# Patient Record
Sex: FEMALE | Race: OTHER | NOT HISPANIC OR LATINO | Employment: PART TIME | URBAN - METROPOLITAN AREA
[De-identification: names, ages, dates, MRNs, and addresses within clinical notes are randomized per-mention and may not be internally consistent; named-entity substitution may affect disease eponyms.]

---

## 2017-01-03 ENCOUNTER — SLEEP CENTER VISIT (OUTPATIENT)
Dept: SLEEP MEDICINE | Facility: MEDICAL CENTER | Age: 27
End: 2017-01-03
Payer: COMMERCIAL

## 2017-01-03 VITALS
SYSTOLIC BLOOD PRESSURE: 104 MMHG | RESPIRATION RATE: 16 BRPM | OXYGEN SATURATION: 77 % | WEIGHT: 147 LBS | DIASTOLIC BLOOD PRESSURE: 80 MMHG | TEMPERATURE: 98.8 F | HEART RATE: 98 BPM | BODY MASS INDEX: 23.63 KG/M2 | HEIGHT: 66 IN

## 2017-01-03 DIAGNOSIS — G47.419 NARCOLEPSY WITHOUT CATAPLEXY(347.00): ICD-10-CM

## 2017-01-03 DIAGNOSIS — G47.11 IDIOPATHIC HYPERSOMNOLENCE: ICD-10-CM

## 2017-01-03 PROCEDURE — 99214 OFFICE O/P EST MOD 30 MIN: CPT | Performed by: INTERNAL MEDICINE

## 2017-01-03 RX ORDER — ARMODAFINIL 150 MG/1
1 TABLET ORAL EVERY MORNING
Qty: 30 TAB | Refills: 4 | Status: SHIPPED | OUTPATIENT
Start: 2017-01-03 | End: 2017-07-14 | Stop reason: SDUPTHER

## 2017-01-03 NOTE — MR AVS SNAPSHOT
"        Dylan Clark   1/3/2017 11:20 AM   Sleep Center Visit   MRN: [id number]    Department:  [facility]   Dept Phone:  [phone]    Description:  Female : 1990   Provider:  Lan CAMP M.D.           Reason for Visit     Narcolepsy without Cataplexy      Allergies as of 1/3/2017     Allergen Noted Reactions    Bactrim [Sulfamethoxazole W-Trimethoprim] 2016   Itching      You were diagnosed with     Narcolepsy without cataplexy   [347.00.ICD-9-CM]       Idiopathic hypersomnolence   [777037]         Vital Signs     Blood Pressure Pulse Temperature Respirations Height Weight    104/80 mmHg 98 37.1 °C (98.8 °F) 16 1.676 m (5' 6\") 66.679 kg (147 lb)    Body Mass Index Oxygen Saturation Smoking Status             23.74 kg/m2 77% Never Smoker          Basic Information     Date Of Birth Sex Race Ethnicity Preferred Language    1990 Female Other Non- English      Your appointments     May 03, 2017  3:00 PM   Follow UP with C Rotation   [facility] (--)    [address] [place] [address]  [place] [id number] [phone]              Problem List              ICD-10-CM Priority Class Noted - Resolved    Obstructive sleep apnea G47.33   2016 - Present    Idiopathic hypersomnolence G47.11   2016 - Present    Narcolepsy without cataplexy G47.419   2016 - Present      Health Maintenance        Date Due Completion Dates    IMM HEP B VACCINE (1 of 3 - Primary Series) 1989 ---    IMM HEP A VACCINE (1 of 2 - Standard Series) 1990 ---    IMM VARICELLA (CHICKENPOX) VACCINE (1 of 2 - 2 Dose Adolescent Series) 2002 ---    IMM DTaP/Tdap/Td Vaccine (1 - Tdap) 2008 ---    PAP SMEAR 2010 ---    IMM INFLUENZA (1) 2016 ---            Current Immunizations     No immunizations on file.      Below and/or attached are the medications your provider expects you to take. Review all of your home medications and newly ordered medications " with your provider and/or pharmacist. Follow medication instructions as directed by your provider and/or pharmacist. Please keep your medication list with you and share with your provider. Update the information when medications are discontinued, doses are changed, or new medications (including over-the-counter products) are added; and carry medication information at all times in the event of emergency situations     Allergies:  BACTRIM - Itching               Medications  Valid as of: January 03, 2017 - 11:46 AM    Generic Name Brand Name Tablet Size Instructions for use    Armodafinil (Tab) Armodafinil 150 MG Take 1 Tab by mouth every morning.        Norethin Ace-Eth Estrad-FE (Tab) BLISOVI FE 1.5/30 1.5-30 MG-MCG Take  by mouth every day.        .                 Medicines prescribed today were sent to:     [facility]/PHARMACY #[id number] - [place], [place] - [address]    [address] [place] [address]    Phone: [phone] Fax: [phone]    Open 24 Hours?: No      Medication refill instructions:       If your prescription bottle indicates you have medication refills left, it is not necessary to call your provider’s office. Please contact your pharmacy and they will refill your medication.    If your prescription bottle indicates you do not have any refills left, you may request refills at any time through one of the following ways: The online Tripbirds system (except Urgent Care), by calling your provider’s office, or by asking your pharmacy to contact your provider’s office with a refill request. Medication refills are processed only during regular business hours and may not be available until the next business day. Your provider may request additional information or to have a follow-up visit with you prior to refilling your medication.   *Please Note: Medication refills are assigned a new Rx number when refilled electronically. Your pharmacy may indicate that no refills were authorized even though a new  prescription for the same medication is available at the pharmacy. Please request the medicine by name with the pharmacy before contacting your provider for a refill.           Crowdonomic Media Access Code: [email or web address]  Expires: 1/18/2017  9:20 AM    Crowdon[employer] Media  A secure, online tool to manage your health information     [employer] Crowdon[employer] Media® is a secure, online tool that connects you to your personalized health information from the privacy of your home -- day or night - making it very easy for you to manage your healthcare. Once the activation process is completed, you can even access your medical information using the Crowdonomic Media buddy, which is available for free in the Apple Buddy store or Google Play store.     Crowdon[employer] Media provides the following levels of access (as shown below):   My Chart Features   [facility]  Urgent  Care Non-[facility]  Primary Care  Doctor   Email your healthcare team securely and privately 24/7 X X X    Manage appointments: schedule your next appointment; view details of past/upcoming appointments X      Request prescription refills. X      View recent personal medical records, including lab and immunizations X X X X   View health record, including health history, allergies, medications X X X X   Read reports about your outpatient visits, procedures, consult and ER notes X X X X   See your discharge summary, which is a recap of your hospital and/or ER visit that includes your diagnosis, lab results, and care plan. X X       How to register for Crowdonomic Media:  1. Go to  [email or web address].  2. Click on the Sign Up Now box, which takes you to the New Member Sign Up page. You will need to provide the following information:  a. Enter your Crowdon[employer] Media Access Code exactly as it appears at the top of this page. (You will not need to use this code after you’ve completed the sign-up process. If you do not sign up before the expiration date, you must request a new code.)    b. Enter your date of birth.   c. Enter your home email address.   d. Click Submit, and follow the next screen’s instructions.  3. Create a DoNanza ID. This will be your DoNanza login ID and cannot be changed, so think of one that is secure and easy to remember.  4. Create a [employer] password. You can change your password at any time.  5. Enter your Password Reset Question and Answer. This can be used at a later time if you forget your password.   6. Enter your e-mail address. This allows you to receive e-mail notifications when new information is available in DoNanza.  7. Click Sign Up. You can now view your health information.    For assistance activating your DoNanza account, call [phone]

## 2017-01-03 NOTE — PROGRESS NOTES
CC:  Excessive daytime somnolence, narcolepsy without cataplexy.    HPI:   Ms. Clark presented with severe daytime somnolence. Polysomnography on the 31st 2016 demonstrated a normal apnea hypopnea index was 0.3 events per hour, a minimum arterial oxygen saturation are 92% on room air, and good continuity of sleep with a low arousal and awakening index. A subsequent polysomnogram on July 27, 2016 demonstrated similar results and was followed by multiple sleep latency study that demonstrated a mean sleep onset latency of 2 minutes and 22 seconds. One REM sleep period was identified but it occurred after a short burst of N2 sleep.    She has been treated with Nuvigil at 150 mg a day. She has been taking the medication regularly and has noticed a significant improvement in daytime alertness. She still falls asleep as a passenger in a motor vehicle and occasionally during prolonged time viewing a computer screen but she has not fallen asleep during engaging activities, meals, conversations or while driving a motor vehicle. She has experienced no side effects from the Nuvigil and specifically is experiencing no jitteriness or agitation. Her heart rate and blood pressure are acceptable.        Patient Active Problem List    Diagnosis Date Noted   • Narcolepsy without cataplexy 09/14/2016   • Obstructive sleep apnea 04/26/2016   • Idiopathic hypersomnolence 04/26/2016       Past Medical History   Diagnosis Date   • Chickenpox        No past surgical history on file.    No family history on file.    Social History     Social History   • Marital Status: Single     Spouse Name: N/A   • Number of Children: N/A   • Years of Education: N/A     Occupational History   • Not on file.     Social History Main Topics   • Smoking status: Never Smoker    • Smokeless tobacco: Never Used   • Alcohol Use: No   • Drug Use: No   • Sexual Activity: Not on file     Other Topics Concern   • Not on file     Social History Narrative  "      Current Outpatient Prescriptions   Medication Sig Dispense Refill   • BLISOVI FE 1.5/30 1.5-30 MG-MCG tablet Take  by mouth every day.     • Armodafinil (NUVIGIL) 150 MG Tab Take 1 Tab by mouth every morning. 30 Tab 3     No current facility-administered medications for this visit.    \"CURRENT RX\"      Allergies: Bactrim      ROS  Unchanged from prior and notable for the LEEP issues reviewed above. All other aspects of the CBD review of systems process are negative.      Physical Exam:   /80 mmHg  Pulse 98  Temp(Src) 37.1 °C (98.8 °F)  Resp 16  Ht 1.676 m (5' 6\")  Wt 66.679 kg (147 lb)  BMI 23.74 kg/m2  SpO2 77%   Head and neck examination demonstrates no mucosal lesion, purulent drainage or evident polyps. The pharynx is benign with a Mallampati I presentation. The neck is supple without thyromegaly. On chest examination there are symmetrical bilateral breath sounds without rales, wheezing or consolidation. On cardiac examination, the apical impulse and heart sounds are normal and the rhythm is regular. There is no murmur, gallop or rub and no jugular venous distention. The abdomen is soft with active bowel sounds and no palpable hepatosplenomegaly, mass, guarding or rebound. The extremities show no clubbing, cyanosis or edema and no signs of deep venous thrombosis. There is no warmth, redness, tenderness or palpable venous cord in the calves. The skin is clear, warm and dry. There is no unusual peripheral lymphadenopathy. Peripheral pulses are palpable in all 4 extremities. On neurologic examination, cranial nerve function is intact, motor tone is symmetrical, and the patient is alert, oriented and responsive.       Problems:  1. Narcolepsy without cataplexy.   She presents with excessive daytime somnolence. Polysomnography on 2 occasions demonstrated no evidence for sleep-disordered breathing or movement disorders and did demonstrate good continuity of sleep. The multiple sleep latency test " demonstrated a short sleep onset latency consistent with the clinical history of excessive daytime somnolence. The significance of the one REM period occurring after a short burst of N2 sleep is not clear. She does not have symptoms suggesting cataplexy. She has had a good response to Nuvigil without significant side effects.    Plan:   1. Continue Nuvigil 150 mg a day. She may take a plan to a drug holiday on days where she does not have major activities. We have again discussed the possibility that the Nuvigil could reduce the effectiveness of oral contraceptives. She is continuing to use the oral contraceptives with barrier precautions as well. The safety of either Nuvigil or Provigil during pregnancy has not been evaluated and there may be a significant risk. It would be best to stop the Nuvigil if she does become pregnant and we would like to avoid the use of other alerting agent such as methamphetamines.    2. With her narcolepsy and the associated daytime somnolence, she may require workplace accommodations including programmed naps and longer times to complete testing. We will be happy to provide further information on this subject as needed.    3. Return visit here in 4 months, sooner if new problems develop.    We appreciate the opportunity to assist in her care.  No Follow-up on file.

## 2017-01-04 ENCOUNTER — TELEPHONE (OUTPATIENT)
Dept: SLEEP MEDICINE | Facility: MEDICAL CENTER | Age: 27
End: 2017-01-04

## 2017-01-04 NOTE — TELEPHONE ENCOUNTER
Patient has questions and a request.  1) She needs a letter for testing.  She needs included: *Description of diagnosis and *Functional limits for test taking scenario  2) Biotin OTC - max daily dose is 10,000mcg; how much should she take?  Should she take the Biotin separately from her Nuvigil?  If so, how far apart - 30min, maybe more?

## 2017-01-05 NOTE — TELEPHONE ENCOUNTER
Discussed with Dr. Cotter.  Letter written and copy for patient available for pickup at the sleep center.  Biotin - do not exceed the max daily dose and it can be taken with the Nuvigil or at any other time of day.    Called patient, left voicemail for call back and advised letter ready for p/up.

## 2017-01-05 NOTE — TELEPHONE ENCOUNTER
Spoke with patient, advised the information below.  Also, patient has additional paperwork that needs to be filled out for her recent test application and will bring it by the sleep center today.

## 2017-01-18 ENCOUNTER — TELEPHONE (OUTPATIENT)
Dept: SLEEP MEDICINE | Facility: MEDICAL CENTER | Age: 27
End: 2017-01-18

## 2017-01-18 NOTE — TELEPHONE ENCOUNTER
Patient called to followup on the paperwork she left for Dr. Cotter a week or two ago.  We do not have it back from the MD yet.  I will followup with her next week after I speak with Dr. Cotter, when he is back in the office.

## 2017-01-27 NOTE — TELEPHONE ENCOUNTER
"Left msg @ patient's home to advise form completed by Dr. Cotter, however there is a disclaimer on the top sheet:  \"Forms completed and signed by a member of the applicant's family, or by the licensed and/or certified professional who diagnosed the disability, will not be considered.\"  Copy available for pickup at /sleep center.  "

## 2017-05-03 ENCOUNTER — SLEEP CENTER VISIT (OUTPATIENT)
Dept: SLEEP MEDICINE | Facility: MEDICAL CENTER | Age: 27
End: 2017-05-03
Payer: COMMERCIAL

## 2017-05-03 VITALS
OXYGEN SATURATION: 97 % | TEMPERATURE: 98.6 F | HEIGHT: 66 IN | BODY MASS INDEX: 23.78 KG/M2 | DIASTOLIC BLOOD PRESSURE: 68 MMHG | WEIGHT: 148 LBS | RESPIRATION RATE: 16 BRPM | HEART RATE: 82 BPM | SYSTOLIC BLOOD PRESSURE: 96 MMHG

## 2017-05-03 DIAGNOSIS — G47.11 IDIOPATHIC HYPERSOMNOLENCE: ICD-10-CM

## 2017-05-03 DIAGNOSIS — G47.419 NARCOLEPSY WITHOUT CATAPLEXY(347.00): ICD-10-CM

## 2017-05-03 PROCEDURE — 99214 OFFICE O/P EST MOD 30 MIN: CPT | Performed by: INTERNAL MEDICINE

## 2017-05-03 NOTE — PROGRESS NOTES
"CC: Follow-up of hypersomnolence           HPI:     Ms. Pina is a 26-year-old woman who returns today for reevaluation of hypersomnolence. She was originally seen in 2016 and had a sleep study which showed a normal AHI of 0.3 low saturation of 92%. She subsequently had a MSLT  protocol performed which again demonstrated no significant obstructive sleep apnea. The MSLT  showed a mean sleep latency of 2 minutes and 22 seconds with one sleep onset REM. Since then, she has been treated with Nuvigil 150 mg a day and has done well with that with an improved level of alertness. She certainly has less sleep attacks and naps but they have not resolved completely.    Moved from [place] to [facility]. Has noted a bit more sleepiness recently. Gets sleepier without water. Occasionally forgets to take Nuvigil with varying results and levels of sleepiness.        Patient Active Problem List    Diagnosis Date Noted   • Narcolepsy without cataplexy 09/14/2016   • Obstructive sleep apnea 04/26/2016   • Idiopathic hypersomnolence 04/26/2016       Past Medical History   Diagnosis Date   • Chickenpox         No past surgical history on file.    No family history on file.    Social History     Social History   • Marital Status:      Spouse Name: N/A   • Number of Children: N/A   • Years of Education: N/A     Occupational History   • Not on file.     Social History Main Topics   • Smoking status: Never Smoker    • Smokeless tobacco: Never Used   • Alcohol Use: No   • Drug Use: No   • Sexual Activity: Not on file     Other Topics Concern   • Not on file     Social History Narrative       Current Outpatient Prescriptions   Medication Sig Dispense Refill   • Armodafinil (NUVIGIL) 150 MG Tab Take 1 Tab by mouth every morning. 30 Tab 4   • BLISOVI FE 1.5/30 1.5-30 MG-MCG tablet Take  by mouth every day.       No current facility-administered medications for this visit.    \"CURRENT RX\"    ALLERGIES: Bactrim    ROS per history of present " "illness otherwise negative.      PHYSICAL EXAM    BP 96/68 mmHg  Pulse 82  Temp(Src) 37 °C (98.6 °F)  Resp 16  Ht 1.676 m (5' 6\")  Wt 67.132 kg (148 lb)  BMI 23.90 kg/m2  SpO2 97%  Appearance: Well-nourished, well-developed, no acute distress  Eyes:  PERRLA, EOMI  Hearing:  Grossly intact  Nose:  Normal, no lesions or deformities, turbinates moist  Oropharynx:  Tongue normal, posterior pharynx without erythema or exudate  Mallampati classification: 1   Neck: Supple, trachea midline, no masses  Respiratory effort:  No intercostal retractions or use of accessory muscles  Lung auscultation:  No wheezes rhonchi rubs or rales  Cardiac auscultation:  No murmurs, rubs, or gallops, no regular rhythm, normal rate  Abdomen:  No tenderness, no organomegaly  Extremities:  No cyanosis, clubbing, edema  Gait and Station:  Normal  Digits and nails: No clubbing, cyanosis, petechiae, or nodes  Musculoskeletal:  Grossly normal  Skin:  No rashes  Orientation:  Oriented time, place, and person  Mood and affect:  No depression, anxiety, agitation  Judgment:  Intact    PROBLEMS:  1. Idiopathic hypersomnolence     2. Narcolepsy without cataplexy               PLAN:   Continue Nuvigil 150 mg daily. Return in 6 months for reevaluation. She will call-in for refills.                        "

## 2017-05-03 NOTE — MR AVS SNAPSHOT
"        Dylan Clark   5/3/2017 3:00 PM   Sleep Center Visit   MRN: [id number]    Department:  [facility]   Dept Phone:  [phone]    Description:  Female : 1990   Provider:  Ed Lopez M.D.           Reason for Visit     Narcolepsy Narcolepsy without Cataplexy      Allergies as of 5/3/2017     Allergen Noted Reactions    Bactrim [Sulfamethoxazole W-Trimethoprim] 2016   Itching      You were diagnosed with     Idiopathic hypersomnolence   [821068]       Narcolepsy without cataplexy   [347.00.ICD-9-CM]         Vital Signs     Blood Pressure Pulse Temperature Respirations Height Weight    96/68 mmHg 82 37 °C (98.6 °F) 16 1.676 m (5' 6\") 67.132 kg (148 lb)    Body Mass Index Oxygen Saturation Smoking Status             23.90 kg/m2 97% Never Smoker          Basic Information     Date Of Birth Sex Race Ethnicity Preferred Language    1990 Female Other Non- English      Your appointments     2017  3:00 PM   Follow UP with C Rotation   [facility] (--)    [address] [place] [address]  [place] [address] [phone]              Problem List              ICD-10-CM Priority Class Noted - Resolved    Obstructive sleep apnea G47.33   2016 - Present    Idiopathic hypersomnolence G47.11   2016 - Present    Narcolepsy without cataplexy G47.419   2016 - Present      Health Maintenance        Date Due Completion Dates    IMM HEP B VACCINE (1 of 3 - Primary Series) 1989 ---    IMM HEP A VACCINE (1 of 2 - Standard Series) 1990 ---    IMM VARICELLA (CHICKENPOX) VACCINE (1 of 2 - 2 Dose Adolescent Series) 2002 ---    IMM DTaP/Tdap/Td Vaccine (1 - Tdap) 2008 ---    PAP SMEAR 2010 ---            Current Immunizations     No immunizations on file.      Below and/or attached are the medications your provider expects you to take. Review all of your home medications and newly ordered medications with your provider and/or " pharmacist. Follow medication instructions as directed by your provider and/or pharmacist. Please keep your medication list with you and share with your provider. Update the information when medications are discontinued, doses are changed, or new medications (including over-the-counter products) are added; and carry medication information at all times in the event of emergency situations     Allergies:  BACTRIM - Itching               Medications  Valid as of: May 03, 2017 -  3:24 PM    Generic Name Brand Name Tablet Size Instructions for use    Armodafinil (Tab) Armodafinil 150 MG Take 1 Tab by mouth every morning.        Norethin Ace-Eth Estrad-FE (Tab) BLISOVI FE 1.5/30 1.5-30 MG-MCG Take  by mouth every day.        .                 Medicines prescribed today were sent to:     [facility]/PHARMACY #[id number] - [place], [place] - [address]    [address] [place] [address]    Phone: [phone] Fax: [phone]    Open 24 Hours?: No      Medication refill instructions:       If your prescription bottle indicates you have medication refills left, it is not necessary to call your provider’s office. Please contact your pharmacy and they will refill your medication.    If your prescription bottle indicates you do not have any refills left, you may request refills at any time through one of the following ways: The online Shmoop system (except Urgent Care), by calling your provider’s office, or by asking your pharmacy to contact your provider’s office with a refill request. Medication refills are processed only during regular business hours and may not be available until the next business day. Your provider may request additional information or to have a follow-up visit with you prior to refilling your medication.   *Please Note: Medication refills are assigned a new Rx number when refilled electronically. Your pharmacy may indicate that no refills were authorized even though a new prescription for the same medication is  available at the pharmacy. Please request the medicine by name with the pharmacy before contacting your provider for a refill.           Acclaimd Access Code: [email or web address]  Expires: 6/2/2017 10:52 AM    Ac[employer]d  A secure, online tool to manage your health information     [employer] Acclaimd® is a secure, online tool that connects you to your personalized health information from the privacy of your home -- day or night - making it very easy for you to manage your healthcare. Once the activation process is completed, you can even access your medical information using the Acclaimd buddy, which is available for free in the Apple Buddy store or Google Play store.     Acclaimd provides the following levels of access (as shown below):   My Chart Features   [facility] Doctor [facility]  Urgent  Care Non-[facility]  Primary Care  Doctor   Email your healthcare team securely and privately 24/7 X X X    Manage appointments: schedule your next appointment; view details of past/upcoming appointments X      Request prescription refills. X      View recent personal medical records, including lab and immunizations X X X X   View health record, including health history, allergies, medications X X X X   Read reports about your outpatient visits, procedures, consult and ER notes X X X X   See your discharge summary, which is a recap of your hospital and/or ER visit that includes your diagnosis, lab results, and care plan. X X       How to register for Acclaimd:  1. Go to  [email or web address].  2. Click on the Sign Up Now box, which takes you to the New Member Sign Up page. You will need to provide the following information:  a. Enter your Acclaimd Access Code exactly as it appears at the top of this page. (You will not need to use this code after you’ve completed the sign-up process. If you do not sign up before the expiration date, you must request a new code.)   b. Enter your date of birth.   c. Enter  your home email address.   d. Click Submit, and follow the next screen’s instructions.  3. Create a Innozt ID. This will be your TrustAlert login ID and cannot be changed, so think of one that is secure and easy to remember.  4. Create a Innozt password. You can change your password at any time.  5. Enter your Password Reset Question and Answer. This can be used at a later time if you forget your password.   6. Enter your e-mail address. This allows you to receive e-mail notifications when new information is available in TrustAlert.  7. Click Sign Up. You can now view your health information.    For assistance activating your TrustAlert account, call [phone]

## 2017-07-14 DIAGNOSIS — G47.419 NARCOLEPSY WITHOUT CATAPLEXY(347.00): ICD-10-CM

## 2017-07-14 DIAGNOSIS — G47.11 IDIOPATHIC HYPERSOMNOLENCE: ICD-10-CM

## 2017-07-14 RX ORDER — ARMODAFINIL 150 MG/1
1 TABLET ORAL EVERY MORNING
Qty: 30 TAB | Refills: 0 | Status: SHIPPED | OUTPATIENT
Start: 2017-07-14 | End: 2017-08-18 | Stop reason: SDUPTHER

## 2017-07-14 NOTE — TELEPHONE ENCOUNTER
Caller Name: Dylan Clark                 Call Back Number: [phone] (home)         Patient approves a detailed voicemail message: yes    Have we ever prescribed this med? Yes.  If yes, what date? 1/3/17    Last OV: 5/3/17    Next OV: 11/3/17    DX: Narcolepsy w/o cataplexy    Medications: Armodafinil-  Pt would like rx to be faxed to pharmacy on file.    Current Outpatient Prescriptions   Medication Sig Dispense Refill   • Armodafinil (NUVIGIL) 150 MG Tab Take 1 Tab by mouth every morning. 30 Tab 4   • BLISOVI FE 1.5/30 1.5-30 MG-MCG tablet Take  by mouth every day.       No current facility-administered medications for this visit.

## 2017-07-15 NOTE — TELEPHONE ENCOUNTER
I spoke to the patient she conformed she uses   [facility]ACY #[id number] - [place], [place] - [address]  [address]  [place] [address]  Phone: [phone] Fax: [phone]    I spoke to Eleanor at [facility] rx called in

## 2017-08-18 DIAGNOSIS — G47.11 IDIOPATHIC HYPERSOMNOLENCE: ICD-10-CM

## 2017-08-18 DIAGNOSIS — G47.419 NARCOLEPSY WITHOUT CATAPLEXY(347.00): ICD-10-CM

## 2017-08-21 RX ORDER — ARMODAFINIL 150 MG/1
TABLET ORAL
Qty: 30 TAB | Refills: 1 | Status: SHIPPED
Start: 2017-08-21 | End: 2017-09-27 | Stop reason: SDUPTHER

## 2017-08-21 NOTE — TELEPHONE ENCOUNTER
Have we ever prescribed this med? Yes.  If yes, what date? 07/14/2017    Last OV: 05/03/2017 - Dr. 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

## 2017-09-21 ENCOUNTER — HOSPITAL ENCOUNTER (OUTPATIENT)
Facility: MEDICAL CENTER | Age: 27
End: 2017-09-21
Payer: COMMERCIAL

## 2017-09-21 LAB
ALBUMIN SERPL BCP-MCNC: 4.1 G/DL (ref 3.2–4.9)
ALBUMIN/GLOB SERPL: 1.1 G/DL
ALP SERPL-CCNC: 41 U/L (ref 30–99)
ALT SERPL-CCNC: 10 U/L (ref 2–50)
ANION GAP SERPL CALC-SCNC: 8 MMOL/L (ref 0–11.9)
AST SERPL-CCNC: 13 U/L (ref 12–45)
BDY FAT % MEASURED: 24 %
BILIRUB SERPL-MCNC: 0.6 MG/DL (ref 0.1–1.5)
BP DIAS: 60 MMHG
BP SYS: 106 MMHG
BUN SERPL-MCNC: 13 MG/DL (ref 8–22)
CALCIUM SERPL-MCNC: 8.9 MG/DL (ref 8.5–10.5)
CHLORIDE SERPL-SCNC: 104 MMOL/L (ref 96–112)
CHOLEST SERPL-MCNC: 134 MG/DL (ref 100–199)
CO2 SERPL-SCNC: 24 MMOL/L (ref 20–33)
CREAT SERPL-MCNC: 0.76 MG/DL (ref 0.5–1.4)
DIABETES HTDIA: NO
EVENT NAME HTEVT: NORMAL
GFR SERPL CREATININE-BSD FRML MDRD: >60 ML/MIN/1.73 M 2
GLOBULIN SER CALC-MCNC: 3.8 G/DL (ref 1.9–3.5)
GLUCOSE SERPL-MCNC: 87 MG/DL (ref 65–99)
HDLC SERPL-MCNC: 41 MG/DL
HYPERTENSION HTHYP: NO
LDLC SERPL CALC-MCNC: 77 MG/DL
POTASSIUM SERPL-SCNC: 3.8 MMOL/L (ref 3.6–5.5)
PROT SERPL-MCNC: 7.9 G/DL (ref 6–8.2)
SCREENING LOC CITY HTCIT: NORMAL
SCREENING LOC STATE HTSTA: NORMAL
SCREENING LOCATION HTLOC: NORMAL
SODIUM SERPL-SCNC: 136 MMOL/L (ref 135–145)
SUBSCRIBER ID HTSID: NORMAL
TRIGL SERPL-MCNC: 78 MG/DL (ref 0–149)

## 2017-09-27 DIAGNOSIS — G47.11 IDIOPATHIC HYPERSOMNOLENCE: ICD-10-CM

## 2017-09-27 DIAGNOSIS — G47.419 NARCOLEPSY WITHOUT CATAPLEXY(347.00): ICD-10-CM

## 2017-09-27 RX ORDER — ARMODAFINIL 150 MG/1
TABLET ORAL
Qty: 30 TAB | Refills: 3 | Status: SHIPPED | OUTPATIENT
Start: 2017-09-27 | End: 2017-09-28 | Stop reason: SDUPTHER

## 2017-09-27 NOTE — TELEPHONE ENCOUNTER
Have we ever prescribed this med? Yes.  If yes, what date? 08/21/2017    Last OV: 05/03/2017    Next OV: 11/03/2017    DX: Narcolepsy    Medications: Armodafinil 150mg    Pt states she is out. Also is wondering if she is able to get more refills on the Rx.

## 2017-09-28 RX ORDER — ARMODAFINIL 150 MG/1
TABLET ORAL
Qty: 30 TAB | Refills: 3 | Status: SHIPPED
Start: 2017-09-28 | End: 2017-11-03 | Stop reason: SDUPTHER

## 2017-09-28 NOTE — TELEPHONE ENCOUNTER
RX Faxed     [facility]  [address]  [place] [address]    P: [phone]  F: [phone]    Confirmation Received

## 2017-10-04 ENCOUNTER — TELEPHONE (OUTPATIENT)
Dept: PULMONOLOGY | Facility: HOSPICE | Age: 27
End: 2017-10-04

## 2017-10-04 NOTE — TELEPHONE ENCOUNTER
MEDICATION PRIOR AUTHORIZATION NEEDED:    1. Name of Medication: Armodafinil 250 mg    2. Requested By (Name of Pharmacy): [facility]     3. Is insurance on file current? yes    4. What is the name & phone number of the 3rd party payor? [employer]rina [phone]

## 2017-10-04 NOTE — TELEPHONE ENCOUNTER
DOCUMENTATION OF PRIOR AUTH STATUS    1. Medication name and dose: Armodafinil 250 mg    2. Name and Phone # of Prescription coverage company: [employer] [phone]    3. Date Prior Auth was submitted: 9/29/2017    4. What information was given to obtain insurance decision: Clinical notes    5. Prior Auth letter Approved or Denied: Approved through 9/29/2018    6. Pharmacy notified: Yes    7. Patient notified: Yes

## 2017-11-03 ENCOUNTER — SLEEP CENTER VISIT (OUTPATIENT)
Dept: SLEEP MEDICINE | Facility: MEDICAL CENTER | Age: 27
End: 2017-11-03
Payer: COMMERCIAL

## 2017-11-03 VITALS
WEIGHT: 148 LBS | BODY MASS INDEX: 23.78 KG/M2 | HEIGHT: 66 IN | OXYGEN SATURATION: 96 % | SYSTOLIC BLOOD PRESSURE: 120 MMHG | TEMPERATURE: 98.6 F | HEART RATE: 91 BPM | RESPIRATION RATE: 16 BRPM | DIASTOLIC BLOOD PRESSURE: 72 MMHG

## 2017-11-03 DIAGNOSIS — G47.11 IDIOPATHIC HYPERSOMNOLENCE: ICD-10-CM

## 2017-11-03 DIAGNOSIS — G47.419 NARCOLEPSY WITHOUT CATAPLEXY: ICD-10-CM

## 2017-11-03 PROCEDURE — 99214 OFFICE O/P EST MOD 30 MIN: CPT | Performed by: INTERNAL MEDICINE

## 2017-11-03 RX ORDER — ARMODAFINIL 150 MG/1
150 TABLET ORAL DAILY
Qty: 30 TAB | Refills: 0 | Status: SHIPPED | OUTPATIENT
Start: 2017-12-15 | End: 2018-05-07 | Stop reason: SDUPTHER

## 2017-11-03 RX ORDER — ARMODAFINIL 150 MG/1
TABLET ORAL
Qty: 30 TAB | Refills: 0 | Status: SHIPPED | OUTPATIENT
Start: 2017-11-15 | End: 2018-03-19 | Stop reason: SDUPTHER

## 2017-11-03 RX ORDER — ARMODAFINIL 150 MG/1
150 TABLET ORAL DAILY
Qty: 30 TAB | Refills: 0 | Status: SHIPPED | OUTPATIENT
Start: 2017-11-03

## 2017-11-03 NOTE — PROGRESS NOTES
CC:  Excessive daytime somnolence, narcolepsy without cataplexy.     HPI:   Ms. Clark presented with severe daytime somnolence. Polysomnography on July 25, 2016 demonstrated a normal apnea hypopnea index of 0.3 events per hour, a minimum arterial oxygen saturation of 92% on room air, and good continuity of sleep with a low arousal and awakening index. A subsequent polysomnogram on July 27, 2016 demonstrated similar results and was followed by multiple sleep latency study that demonstrated a mean sleep onset latency of 2 minutes and 22 seconds. One REM sleep period was identified but it occurred after a short burst of N2 sleep.     She has been treated with Nuvigil at 150 mg a day. She has been taking the medication regularly and has noticed a significant improvement in daytime alertness. She still falls asleep as a passenger in a motor vehicle and occasionally during prolonged time viewing a computer screen but she has not fallen asleep during engaging activities, meals, conversations or while driving a motor vehicle. She has experienced no side effects from the Nuvigil and specifically is experiencing no jitteriness or agitation. Her heart rate and blood pressure are acceptable.        Patient Active Problem List    Diagnosis Date Noted   • Narcolepsy without cataplexy(347.00) 09/14/2016   • Obstructive sleep apnea 04/26/2016   • Idiopathic hypersomnolence 04/26/2016       Past Medical History:   Diagnosis Date   • Chickenpox        History reviewed. No pertinent surgical history.    History reviewed. No pertinent family history.    Social History     Social History   • Marital status:      Spouse name: N/A   • Number of children: N/A   • Years of education: N/A     Occupational History   • Not on file.     Social History Main Topics   • Smoking status: Never Smoker   • Smokeless tobacco: Never Used   • Alcohol use No   • Drug use: No   • Sexual activity: Not on file     Other Topics Concern   • Not on  "file     Social History Narrative   • No narrative on file       Current Outpatient Prescriptions   Medication Sig Dispense Refill   • Armodafinil 150 MG Tab TAKE 1 TABLET BY MOUTH EVERY MORNING 30 Tab 3   • BLISOVI FE 1.5/30 1.5-30 MG-MCG tablet Take  by mouth every day.       No current facility-administered medications for this visit.     \"CURRENT RX\"      Allergies: Bactrim [sulfamethoxazole w-trimethoprim]      ROS  Unchanged from prior and notable for the sleep issues reviewed above. All other aspects of the CPT review of systems process are negative.      Physical Exam:   /72   Pulse 91   Temp 37 °C (98.6 °F)   Resp 16   Ht 1.676 m (5' 6\")   Wt 67.1 kg (148 lb)   SpO2 96%   BMI 23.89 kg/m²    Head and neck examination demonstrates no mucosal lesion, purulent drainage or evident polyps. The pharynx is benign with a Mallampati I presentation. The neck is supple without thyromegaly. On chest examination there are symmetrical bilateral breath sounds without rales, wheezing or consolidation. On cardiac examination, the apical impulse and heart sounds are normal and the rhythm is regular. There is no murmur, gallop or rub and no jugular venous distention. The abdomen is soft with active bowel sounds and no palpable hepatosplenomegaly, mass, guarding or rebound. The extremities show no clubbing, cyanosis or edema and no signs of deep venous thrombosis. There is no warmth, redness, tenderness or palpable venous cord in the calves. The skin is clear, warm and dry. There is no unusual peripheral lymphadenopathy. Peripheral pulses are palpable in all 4 extremities. On neurologic examination, cranial nerve function is intact, motor tone is symmetrical, and the patient is alert, oriented and responsive.       Problems:  1. Narcolepsy without cataplexy.   She presents with excessive daytime somnolence. Polysomnography on 2 occasions demonstrated no evidence for sleep-disordered breathing or movement disorders " and did demonstrate good continuity of sleep. The multiple sleep latency test demonstrated a short sleep onset latency consistent with the clinical history of excessive daytime somnolence. The significance of the one REM period occurring after a short burst of N2 sleep is not clear. She does not have symptoms suggesting cataplexy. She has had a good response to Nuvigil without significant side effects.      Plan:   1. Continue Nuvigil 150 mg a day. She may take a plan to a drug holiday on days where she does not have major activities. We have again discussed the possibility that the Nuvigil could reduce the effectiveness of oral contraceptives. She is continuing to use the oral contraceptives with barrier precautions as well. The safety of either Nuvigil or Provigil during pregnancy has not been evaluated and there may be a significant risk. It would be best to stop the Nuvigil if she does become pregnant and we would like to avoid the use of other alerting agent such as methamphetamines.     2. With her narcolepsy and the associated daytime somnolence, she may require workplace accommodations including programmed naps and longer times to complete testing. We will be happy to provide further information on this subject as needed.     3. Return visit here in 6 months, sooner if new problems develop.     We appreciate the opportunity to assist in her care.

## 2018-03-16 ENCOUNTER — PATIENT MESSAGE (OUTPATIENT)
Dept: SLEEP MEDICINE | Facility: MEDICAL CENTER | Age: 28
End: 2018-03-16

## 2018-03-16 DIAGNOSIS — G47.11 IDIOPATHIC HYPERSOMNOLENCE: ICD-10-CM

## 2018-03-16 DIAGNOSIS — G47.419 NARCOLEPSY WITHOUT CATAPLEXY: ICD-10-CM

## 2018-03-19 RX ORDER — ARMODAFINIL 150 MG/1
TABLET ORAL
Qty: 30 TAB | Refills: 2 | Status: SHIPPED
Start: 2018-03-19 | End: 2018-05-19

## 2018-03-19 NOTE — TELEPHONE ENCOUNTER
From: Dylan Clark  To: Lan Cotter M.D.  Sent: 3/16/2018 9:14 PM PDT  Subject: Prescription Question    Dr. Cotter,    The [facility] pharmacy told me that I used my last refill for the Armodafinil earlier this week.     Would you mind sending another prescription to my pharmacy until we have our check in appointment in May, please?    Thank you!     Sincerely,   Dylan

## 2018-03-19 NOTE — TELEPHONE ENCOUNTER
Have we ever prescribed this med? Yes.  If yes, what date? 12/15/2017    Last OV: 11/03/2017 - Dr. Cotter    Next OV: 05/07/2018 - C rotation     DX: Narcolepsy without cataplexy    Medications: Armodafinil

## 2018-04-23 ENCOUNTER — OFFICE VISIT (OUTPATIENT)
Dept: URGENT CARE | Facility: PHYSICIAN GROUP | Age: 28
End: 2018-04-23
Payer: COMMERCIAL

## 2018-04-23 VITALS
SYSTOLIC BLOOD PRESSURE: 100 MMHG | OXYGEN SATURATION: 96 % | HEART RATE: 86 BPM | DIASTOLIC BLOOD PRESSURE: 58 MMHG | RESPIRATION RATE: 16 BRPM | WEIGHT: 142 LBS | TEMPERATURE: 97.8 F | BODY MASS INDEX: 22.82 KG/M2 | HEIGHT: 66 IN
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
## 2018-04-23 PROCEDURE — 99203 OFFICE O/P NEW LOW 30 MIN: CPT | Mod: 25 | Performed by: FAMILY MEDICINE

## 2018-04-23 RX ORDER — CLOBETASOL PROPIONATE 0.5 MG/G
OINTMENT TOPICAL
Qty: 30 G | Refills: 0 | Status: SHIPPED | OUTPATIENT
Start: 2018-04-23

## 2018-04-23 RX ORDER — DEXAMETHASONE SODIUM PHOSPHATE 4 MG/ML
8 INJECTION, SOLUTION INTRA-ARTICULAR; INTRALESIONAL; INTRAMUSCULAR; INTRAVENOUS; SOFT TISSUE ONCE
Status: COMPLETED | OUTPATIENT
Start: 2018-04-23 | End: 2018-04-23

## 2018-04-23 RX ADMIN — DEXAMETHASONE SODIUM PHOSPHATE 8 MG: 4 INJECTION, SOLUTION INTRA-ARTICULAR; INTRALESIONAL; INTRAMUSCULAR; INTRAVENOUS; SOFT TISSUE at 18:50

## 2018-04-23 ASSESSMENT — ENCOUNTER SYMPTOMS
FEVER: 0
CHILLS: 0
DIZZINESS: 0
FOCAL WEAKNESS: 0

## 2018-04-24 NOTE — PROGRESS NOTES
"Subjective:      Dylan Clark is a 27 y.o. female who presents with Hand Swelling (Left;)    Chief Complaint   Patient presents with   • Hand Swelling     Left;        - This is a very pleasant 27 y.o. female with complaints of bit Lt forearm/hand by insect yesterday, ? Mosquito. Itchy swollen now.           ALLERGIES:  Bactrim [sulfamethoxazole w-trimethoprim]     PMH:  Past Medical History:   Diagnosis Date   • Chickenpox         MEDS:    Current Outpatient Prescriptions:   •  clobetasol (TEMOVATE) 0.05 % Ointment, Apply BID x 1 week, Disp: 30 g, Rfl: 0  •  Armodafinil 150 MG Tab, TAKE 1 TABLET BY MOUTH EVERY MORNING, Disp: 30 Tab, Rfl: 2  •  Armodafinil 150 MG Tab, Take 150 mg by mouth every day., Disp: 30 Tab, Rfl: 0  •  Armodafinil 150 MG Tab, Take 150 mg by mouth every day., Disp: 30 Tab, Rfl: 0  •  BLISOVI FE 1.5/30 1.5-30 MG-MCG tablet, Take  by mouth every day., Disp: , Rfl:     Current Facility-Administered Medications:   •  dexamethasone (DECADRON) injection 8 mg, 8 mg, Intramuscular, Once, Esteban Dickerson M.D.    ** I have documented what I find to be significant in regards to past medical, social, family and surgical history  in my HPI or under PMH/PSH/FH review section, otherwise it is contributory **           HPI    Review of Systems   Constitutional: Negative for chills and fever.   Neurological: Negative for dizziness and focal weakness.          Objective:     /58   Pulse 86   Temp 36.6 °C (97.8 °F)   Resp 16   Ht 1.676 m (5' 6\")   Wt 64.4 kg (142 lb)   SpO2 96%   BMI 22.92 kg/m²      Physical Exam   Constitutional: She appears well-developed. No distress.   HENT:   Head: Normocephalic and atraumatic.   Cardiovascular: Regular rhythm.    No murmur heard.  Pulmonary/Chest: Effort normal. No respiratory distress.   Neurological: She is alert. She exhibits normal muscle tone.   Skin: Skin is warm and dry.   Psychiatric: She has a normal mood and affect. Judgment normal.   Nursing " note and vitals reviewed.  Lt forearm/hand, + edema and several mosquito like papules             Assessment/Plan:         1. Insect bite, initial encounter  dexamethasone (DECADRON) injection 8 mg    clobetasol (TEMOVATE) 0.05 % Ointment             Dx & d/c instructions discussed w/ patient and/or family members. Follow up w/ Prvt Dr or here in 3-4 days if not getting better, sooner if needed,  ER if worse and UC/PCP unavailable.        Possible side effects (i.e. Rash, GI upset/constipation, sedation, elevation of BP or sugars) of any medications given discussed.

## 2018-05-07 ENCOUNTER — SLEEP CENTER VISIT (OUTPATIENT)
Dept: SLEEP MEDICINE | Facility: MEDICAL CENTER | Age: 28
End: 2018-05-07
Payer: COMMERCIAL

## 2018-05-07 VITALS
RESPIRATION RATE: 14 BRPM | WEIGHT: 144 LBS | HEART RATE: 97 BPM | HEIGHT: 66 IN | OXYGEN SATURATION: 94 % | SYSTOLIC BLOOD PRESSURE: 110 MMHG | DIASTOLIC BLOOD PRESSURE: 68 MMHG | BODY MASS INDEX: 23.14 KG/M2

## 2018-05-07 DIAGNOSIS — G47.419 NARCOLEPSY WITHOUT CATAPLEXY: ICD-10-CM

## 2018-05-07 PROCEDURE — 99213 OFFICE O/P EST LOW 20 MIN: CPT | Performed by: INTERNAL MEDICINE

## 2018-05-07 RX ORDER — NORETHINDRONE ACETATE AND ETHINYL ESTRADIOL 1MG-20(24)
1 KIT ORAL
Refills: 0 | COMMUNITY
Start: 2018-04-04

## 2018-05-07 RX ORDER — ARMODAFINIL 150 MG/1
150 TABLET ORAL DAILY
Qty: 90 TAB | Refills: 0 | Status: SHIPPED | OUTPATIENT
Start: 2018-05-07 | End: 2018-06-06

## 2018-05-07 NOTE — PROGRESS NOTES
Chief Complaint   Patient presents with   • Follow-Up     [place]       HPI:  Ms. Clark presented with severe daytime somnolence. Polysomnography on July 25, 2016 demonstrated a normal apnea hypopnea index of 0.3 events per hour, a minimum arterial oxygen saturation of 92% on room air, and good continuity of sleep with a low arousal and awakening index. A subsequent polysomnogram on July 27, 2016 demonstrated similar results and was followed by multiple sleep latency study that demonstrated a mean sleep onset latency of 2 minutes and 22 seconds. One REM sleep period was identified but it occurred after a short burst of N2 sleep.     She has been treated with Nuvigil at 150 mg a day. She has been taking the medication regularly and has noticed a significant improvement in daytime alertness. She still falls asleep as a passenger in a motor vehicle and occasionally during prolonged time viewing a computer screen but she has not fallen asleep during engaging activities, meals, conversations or while driving a motor vehicle. She has experienced no side effects from the Nuvigil and specifically is experiencing no jitteriness or agitation. Her heart rate and blood pressure are acceptable.  She does take an occasional drug holiday. There are certain days when she feels somewhat sleepy but does not fall asleep inappropriately.    Past Medical History:   Diagnosis Date   • Chickenpox        ROS:   Constitutional: Denies fevers, chills, night sweats, fatigue or weight loss  Eyes: Denies vision loss, pain, drainage, double vision  Ears, Nose, Throat: Denies earache, tinnitus, hoarseness  Cardiovascular: Denies chest pain, tightness, palpitations  Respiratory: Denies shortness of breath, sputum production, cough, hemoptysis  Sleep: See history of present illness  GI: Denies abdominal pain, nausea, vomiting, diarrhea  : Denies frequent urination, hematuria, painful urination  Musculoskeletal: Denies back pain, painful  "joints, sore muscles  Neurological: Denies headaches, seizures  Skin: Denies rashes, color changes  Psychiatric: Denies depression or thoughts of suicide  Hematologic: Denies bleeding tendency or clotting tendency  Allergic/Immunologic: Denies rhinitis, skin sensitivity    Social History     Social History   • Marital status:      Spouse name: N/A   • Number of children: N/A   • Years of education: N/A     Occupational History   • Not on file.     Social History Main Topics   • Smoking status: Never Smoker   • Smokeless tobacco: Never Used   • Alcohol use No   • Drug use: No   • Sexual activity: Not on file     Other Topics Concern   • Not on file     Social History Narrative   • No narrative on file     Bactrim [sulfamethoxazole w-trimethoprim]  Current Outpatient Prescriptions on File Prior to Visit   Medication Sig Dispense Refill   • Armodafinil 150 MG Tab TAKE 1 TABLET BY MOUTH EVERY MORNING 30 Tab 2   • Armodafinil 150 MG Tab Take 150 mg by mouth every day. 30 Tab 0   • clobetasol (TEMOVATE) 0.05 % Ointment Apply BID x 1 week 30 g 0   • BLISOVI FE 1.5/30 1.5-30 MG-MCG tablet Take  by mouth every day.       No current facility-administered medications on file prior to visit.      Blood pressure 110/68, pulse 97, resp. rate 14, height 1.676 m (5' 6\"), weight 65.3 kg (144 lb), SpO2 94 %.  History reviewed. No pertinent family history.    Physical Exam:    HEENT: PERRLA, EOMI, no scleral icterus, no nasal or oral lesions  Neck: No thyromegaly, no adenopathy, no bruits  Mallampatti: Grade II  Lungs: Equal breath sounds, no wheezes or crackles  Heart: Regular rate and rhythm, no gallops or murmurs  Abdomen: Soft, benign, no organomegaly  Extremities: No clubbing, cyanosis, or edema  Neurologic: Cranial nerve, motor, and sensory exam are normal    1. Narcolepsy without cataplexy      Continue Nuvigil 150 mg a day. She may take a plan to a drug holiday on days where she does not have major activities. We have " again discussed the possibility that the Nuvigil could reduce the effectiveness of oral contraceptives. She is continuing to use the oral contraceptives with barrier precautions as well. The safety of either Nuvigil or Provigil during pregnancy has not been evaluated and there may be a significant risk. It would be best to stop the Nuvigil if she does become pregnant and we would like to avoid the use of other alerting agent such as methamphetamines.

## 2018-09-08 ENCOUNTER — HOSPITAL ENCOUNTER (OUTPATIENT)
Facility: MEDICAL CENTER | Age: 28
End: 2018-09-08
Payer: COMMERCIAL

## 2018-09-08 LAB
BDY FAT % MEASURED: 20.5 %
BP DIAS: 72 MMHG
BP SYS: 110 MMHG
CHOLEST SERPL-MCNC: 141 MG/DL (ref 100–199)
DIABETES HTDIA: NO
EVENT NAME HTEVT: NORMAL
FASTING STATUS PATIENT QL REPORTED: NORMAL
GLUCOSE SERPL-MCNC: 90 MG/DL (ref 65–99)
HDLC SERPL-MCNC: 44 MG/DL
HYPERTENSION HTHYP: NO
LDLC SERPL CALC-MCNC: 80 MG/DL
SCREENING LOC CITY HTCIT: NORMAL
SCREENING LOC STATE HTSTA: NORMAL
SCREENING LOCATION HTLOC: NORMAL
SUBSCRIBER ID HTSID: NORMAL
TRIGL SERPL-MCNC: 86 MG/DL (ref 0–149)

## 2018-11-07 ENCOUNTER — SLEEP CENTER VISIT (OUTPATIENT)
Dept: SLEEP MEDICINE | Facility: MEDICAL CENTER | Age: 28
End: 2018-11-07
Payer: COMMERCIAL

## 2018-11-07 VITALS
HEIGHT: 66 IN | HEART RATE: 81 BPM | BODY MASS INDEX: 23.14 KG/M2 | DIASTOLIC BLOOD PRESSURE: 62 MMHG | RESPIRATION RATE: 16 BRPM | SYSTOLIC BLOOD PRESSURE: 100 MMHG | WEIGHT: 144 LBS | OXYGEN SATURATION: 96 %

## 2018-11-07 DIAGNOSIS — G47.11 IDIOPATHIC HYPERSOMNOLENCE: ICD-10-CM

## 2018-11-07 PROCEDURE — 99214 OFFICE O/P EST MOD 30 MIN: CPT | Performed by: INTERNAL MEDICINE

## 2018-11-07 NOTE — PROGRESS NOTES
CC: Follow up for sleep disturbance    HPI:  Ms. Dylan Clark is a 28-year-old woman who returns for reevaluation of hypersomnolence.  Her polysomnography on 7/25/2016 showed an AHI of 0.3 with a arin saturation of 92%.  She had a subsequent PSG performed 7/27/2016 which was similar in nature and was followed by an MSLT which showed a mean onset sleep latency of 2 minutes and 22 seconds with 1 REM sleep.  But it occurred after short burst of N2 sleep.  The patient was diagnosed with idiopathic hypersomnolence and has been treated successfully with Nuvigil 150 mg daily.  She responded well to Nuvigil but is currently off it as she is trying to get pregnant.    She had no problems going off of Nuvigil.  Interestingly her level of fatigue has not changed appreciably going off Nuvigil but she has not yet gone on a road trip.  She wonders if she needs to be retested when she ultimately chooses to go back on Nuvigil.  I would recommend repeating the MSLT protocol at that time to see if there is been any changes in her sleep latency or sleep onset REM status.          Patient Active Problem List    Diagnosis Date Noted   • Narcolepsy without cataplexy(347.00) 09/14/2016   • Obstructive sleep apnea 04/26/2016   • Idiopathic hypersomnolence 04/26/2016       Past Medical History:   Diagnosis Date   • Chickenpox    • Narcolepsy without cataplexy         No past surgical history on file.    No family history on file.    Social History     Social History   • Marital status:      Spouse name: N/A   • Number of children: N/A   • Years of education: N/A     Occupational History   • Not on file.     Social History Main Topics   • Smoking status: Never Smoker   • Smokeless tobacco: Never Used   • Alcohol use No   • Drug use: No   • Sexual activity: Not on file     Other Topics Concern   • Not on file     Social History Narrative   • No narrative on file       Current Outpatient Prescriptions   Medication Sig Dispense  "Refill   • BLISOVI 24 FE 1-20 MG-MCG(24) Tab Take 1 Tab by mouth every day.  0   • clobetasol (TEMOVATE) 0.05 % Ointment Apply BID x 1 week 30 g 0   • Armodafinil 150 MG Tab Take 150 mg by mouth every day. (Patient not taking: Reported on 11/7/2018) 30 Tab 0   • BLISOVI FE 1.5/30 1.5-30 MG-MCG tablet Take  by mouth every day.       No current facility-administered medications for this visit.     \"CURRENT RX\"    ALLERGIES: Bactrim [sulfamethoxazole w-trimethoprim]    ROS    Constitutional: Denies fever, chills, sweats,  weight loss, fatigue  Cardiovascular: Denies chest pain, tightness, palpitations, swelling in legs/feet, fainting, difficulty breathing when lying down but gets better when sitting up.   Respiratory: Denies shortness of breath, cough, sputum, wheezing, painful breathing, coughing up blood.   Sleep: per HPI  Gastrointestinal: Denies  difficulty swallowing, nausea, abdominal pain, diarrhea, constipation, heartburn..   Musculoskeletal: Denies painful joints, sore muscles, back pain.        PHYSICAL EXAM  Normal weight    /62 (BP Location: Right arm, Patient Position: Sitting, BP Cuff Size: Adult)   Pulse 81   Resp 16   Ht 1.676 m (5' 6\")   Wt 65.3 kg (144 lb)   SpO2 96%   BMI 23.24 kg/m²   Appearance: Well-nourished, well-developed, no acute distress  Eyes:  PERRLA, EOMI  ENMT: without lesions, deformities;hearing grossly intact; tongue normal, posterior pharynx without erythema or exudate; Mallampati classification:   Neck: Supple, trachea midline, no masses  Respiratory effort:  No intercostal retractions or use of accessory muscles  Lung auscultation:  No wheezes rhonchi rubs or rales  Cardiac: No murmurs, rubs, or gallops; regular rhythm, normal rate; no edema  Abdomen:  No tenderness, no organomegaly  Musculoskeletal:  Grossly normal; gait and station normal; digits and nails normal  Skin:  No rashes, petechiae, cyanosis  Neurologic: without focal signs; oriented to person, time, place, " and purpose; judgement intact  Psychiatric:  No depression, anxiety, agitation        PROBLEMS:  1. Idiopathic hypersomnolence      PLAN:   The patient is currently attempting to get pregnant and therefore has gone off of armodafinil as a precautionary measure.    There is limited data available on infant health with mother's breast-feeding while taking armodafinil.  Should armodafinil be used during breast-feeding then the infant health should be carefully monitored.  As a precautionary measure would recommend remaining off armodafinil during breast-feeding.      Return in about 6 months (around 5/7/2019).

## 2019-05-02 NOTE — PROGRESS NOTES
Okay stress CC: Follow up for hypersomnolence    HPI:  Ms. Dylan Clark is a 28-year-old  for [facility] who returns for reevaluation of hypersomnolence.  Her polysomnography on 7/25/2016 showed an AHI of 0.3 with a arin saturation of 92%.  She had a subsequent PSG performed 7/27/2016 which was similar in nature and was followed by an MSLT which showed a mean onset sleep latency of 2 minutes and 22 seconds with 1 REM sleep.      The patient was managed with Nuvigil for apparent idiopathic hypersomnolence and treatment was successful but when last seen she was off Nuvigil as she was trying to get pregnant.  I recommended repeating the MSLT protocol prior to her going back on nuvigil.    Still trying to get pregnant. Able to drive with  along for up to 2 hours. Not sleeping as much.    She will remain off Nuvigil and continue to try and get pregnant.  She has gained a little bit of weight she is going to try to eat more mindfully.      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 No pertinent surgical history.    History reviewed. No pertinent family history.    Social History     Social History   • Marital status:      Spouse name: N/A   • Number of children: N/A   • Years of education: N/A     Occupational History   • Not on file.     Social History Main Topics   • Smoking status: Never Smoker   • Smokeless tobacco: Never Used   • Alcohol use No   • Drug use: No   • Sexual activity: Not on file     Other Topics Concern   • Not on file     Social History Narrative   • No narrative on file       Current Outpatient Prescriptions   Medication Sig Dispense Refill   • Prenatal Vit-Fe Fumarate-FA (PRENATAL  "COMPLETE PO) Take  by mouth.     • CALCIUM CITRATE PO Take  by mouth.     • BLISOVI 24 FE 1-20 MG-MCG(24) Tab Take 1 Tab by mouth every day.  0   • clobetasol (TEMOVATE) 0.05 % Ointment Apply BID x 1 week (Patient not taking: Reported on 5/3/2019) 30 g 0   • Armodafinil 150 MG Tab Take 150 mg by mouth every day. (Patient not taking: Reported on 11/7/2018) 30 Tab 0   • BLISOVI FE 1.5/30 1.5-30 MG-MCG tablet Take  by mouth every day.       No current facility-administered medications for this visit.     \"CURRENT RX\"    ALLERGIES: Bactrim [sulfamethoxazole w-trimethoprim]    ROS    Constitutional: Denies fever, chills, sweats,  weight loss, fatigue  Cardiovascular: Denies chest pain, tightness, palpitations, swelling in legs/feet, fainting, difficulty breathing when lying down but gets better when sitting up.   Respiratory: Denies shortness of breath, cough, sputum, wheezing, painful breathing, coughing up blood.   Sleep: per HPI  Gastrointestinal: Denies  difficulty swallowing, nausea, abdominal pain, diarrhea, constipation, heartburn.  Musculoskeletal: Denies painful joints, sore muscles, back pain.  Injured knee and gained weight.      PHYSICAL EXAM      /60 (BP Location: Right arm, Patient Position: Sitting, BP Cuff Size: Adult)   Pulse 83   Resp 16   Ht 1.676 m (5' 6\")   Wt 73.9 kg (163 lb)   SpO2 96%   BMI 26.31 kg/m²   Appearance: Well-nourished, well-developed, no acute distress  Eyes:  PERRLA, EOMI  ENMT: without lesions, deformities;hearing grossly intact; tongue normal, posterior pharynx without erythema or exudate; Mallampati classification:   Neck: Supple, trachea midline, no masses  Respiratory effort:  No intercostal retractions or use of accessory muscles  Lung auscultation:  No wheezes rhonchi rubs or rales  Cardiac: No murmurs, rubs, or gallops; regular rhythm, normal rate; no edema  Abdomen:  No tenderness, no organomegaly  Musculoskeletal:  Grossly normal; gait and station normal; digits " and nails normal  Skin:  No rashes, petechiae, cyanosis  Neurologic: without focal signs; oriented to person, time, place, and purpose; judgement intact  Psychiatric:  No depression, anxiety, agitation        PROBLEMS:  1. Idiopathic hypersomnolence      PLAN:   She will remain off of Nuvigil.  She is still trying to get pregnant.  She is concerned about some recent weight gain.  She will try to eat more mindfully and increase her exercise.  She had a recent knee injury and was faceted which she blames somewhat for her weight gain.    Return in about 1 year (around 5/3/2020).

## 2019-05-03 ENCOUNTER — SLEEP CENTER VISIT (OUTPATIENT)
Dept: SLEEP MEDICINE | Facility: MEDICAL CENTER | Age: 29
End: 2019-05-03
Payer: COMMERCIAL

## 2019-05-03 VITALS
OXYGEN SATURATION: 96 % | SYSTOLIC BLOOD PRESSURE: 108 MMHG | HEART RATE: 83 BPM | BODY MASS INDEX: 26.2 KG/M2 | DIASTOLIC BLOOD PRESSURE: 60 MMHG | HEIGHT: 66 IN | RESPIRATION RATE: 16 BRPM | WEIGHT: 163 LBS

## 2019-05-03 DIAGNOSIS — G47.11 IDIOPATHIC HYPERSOMNOLENCE: ICD-10-CM

## 2019-05-03 PROCEDURE — 99214 OFFICE O/P EST MOD 30 MIN: CPT | Performed by: INTERNAL MEDICINE

## 2019-12-04 ENCOUNTER — HOSPITAL ENCOUNTER (OUTPATIENT)
Dept: RADIOLOGY | Facility: MEDICAL CENTER | Age: 29
End: 2019-11-20
Attending: OBSTETRICS & GYNECOLOGY
Payer: COMMERCIAL

## 2019-12-04 DIAGNOSIS — N93.8 DYSFUNCTIONAL UTERINE BLEEDING: ICD-10-CM

## 2019-12-04 DIAGNOSIS — N92.6 IRREGULAR MENSTRUAL CYCLE: ICD-10-CM

## 2019-12-11 ENCOUNTER — HOSPITAL ENCOUNTER (OUTPATIENT)
Dept: RADIOLOGY | Facility: MEDICAL CENTER | Age: 29
End: 2019-12-11
Attending: OBSTETRICS & GYNECOLOGY
Payer: COMMERCIAL

## 2019-12-11 PROCEDURE — 76830 TRANSVAGINAL US NON-OB: CPT

## 2020-08-29 ENCOUNTER — HOSPITAL ENCOUNTER (INPATIENT)
Dept: HOSPITAL 8 - LDOP | Age: 30
LOS: 2 days | Discharge: HOME | End: 2020-08-31
Attending: OBSTETRICS & GYNECOLOGY | Admitting: OBSTETRICS & GYNECOLOGY
Payer: COMMERCIAL

## 2020-08-29 ENCOUNTER — HOSPITAL ENCOUNTER (OUTPATIENT)
Dept: HOSPITAL 8 - LDOP | Age: 30
Discharge: HOME | End: 2020-08-29
Attending: OBSTETRICS & GYNECOLOGY
Payer: COMMERCIAL

## 2020-08-29 VITALS — SYSTOLIC BLOOD PRESSURE: 107 MMHG | DIASTOLIC BLOOD PRESSURE: 52 MMHG

## 2020-08-29 VITALS — SYSTOLIC BLOOD PRESSURE: 123 MMHG | DIASTOLIC BLOOD PRESSURE: 57 MMHG

## 2020-08-29 VITALS — HEIGHT: 66 IN | WEIGHT: 191.36 LBS | BODY MASS INDEX: 30.75 KG/M2

## 2020-08-29 DIAGNOSIS — Z3A.38: ICD-10-CM

## 2020-08-29 LAB
BASOPHILS # BLD AUTO: 0.01 X10^3/UL (ref 0–0.1)
BASOPHILS NFR BLD AUTO: 0 % (ref 0–1)
EOSINOPHIL # BLD AUTO: 0.11 X10^3/UL (ref 0–0.4)
EOSINOPHIL NFR BLD AUTO: 1 % (ref 1–7)
ERYTHROCYTE [DISTWIDTH] IN BLOOD BY AUTOMATED COUNT: 14.1 % (ref 9.6–15.2)
LYMPHOCYTES # BLD AUTO: 0.93 X10^3/UL (ref 1–3.4)
LYMPHOCYTES NFR BLD AUTO: 7 % (ref 22–44)
MCH RBC QN AUTO: 31.3 PG (ref 27–34.8)
MCHC RBC AUTO-ENTMCNC: 33.8 G/DL (ref 32.4–35.8)
MCV RBC AUTO: 92.7 FL (ref 80–100)
MD: NO
MONOCYTES # BLD AUTO: 0.7 X10^3/UL (ref 0.2–0.8)
MONOCYTES NFR BLD AUTO: 5 % (ref 2–9)
NEUTROPHILS # BLD AUTO: 12.02 X10^3/UL (ref 1.8–6.8)
NEUTROPHILS NFR BLD AUTO: 87 % (ref 42–75)
PLATELET # BLD AUTO: 187 X10^3/UL (ref 130–400)
PMV BLD AUTO: 8.5 FL (ref 7.4–10.4)
RBC # BLD AUTO: 4.21 X10^6/UL (ref 3.82–5.3)

## 2020-08-29 PROCEDURE — 86592 SYPHILIS TEST NON-TREP QUAL: CPT

## 2020-08-29 PROCEDURE — 3E0R3BZ INTRODUCTION OF ANESTHETIC AGENT INTO SPINAL CANAL, PERCUTANEOUS APPROACH: ICD-10-PCS | Performed by: OBSTETRICS & GYNECOLOGY

## 2020-08-29 PROCEDURE — 86900 BLOOD TYPING SEROLOGIC ABO: CPT

## 2020-08-29 PROCEDURE — 86850 RBC ANTIBODY SCREEN: CPT

## 2020-08-29 PROCEDURE — 82803 BLOOD GASES ANY COMBINATION: CPT

## 2020-08-29 PROCEDURE — 0KQM0ZZ REPAIR PERINEUM MUSCLE, OPEN APPROACH: ICD-10-PCS | Performed by: OBSTETRICS & GYNECOLOGY

## 2020-08-29 PROCEDURE — 00HU33Z INSERTION OF INFUSION DEVICE INTO SPINAL CANAL, PERCUTANEOUS APPROACH: ICD-10-PCS | Performed by: OBSTETRICS & GYNECOLOGY

## 2020-08-29 PROCEDURE — 36415 COLL VENOUS BLD VENIPUNCTURE: CPT

## 2020-08-29 PROCEDURE — 59025 FETAL NON-STRESS TEST: CPT

## 2020-08-29 PROCEDURE — 85025 COMPLETE CBC W/AUTO DIFF WBC: CPT

## 2020-08-29 RX ADMIN — SODIUM CHLORIDE, SODIUM LACTATE, POTASSIUM CHLORIDE, AND CALCIUM CHLORIDE SCH MLS/HR: .6; .31; .03; .02 INJECTION, SOLUTION INTRAVENOUS at 19:07

## 2020-08-30 VITALS — SYSTOLIC BLOOD PRESSURE: 106 MMHG | DIASTOLIC BLOOD PRESSURE: 70 MMHG

## 2020-08-30 VITALS — DIASTOLIC BLOOD PRESSURE: 67 MMHG | SYSTOLIC BLOOD PRESSURE: 111 MMHG

## 2020-08-30 VITALS — DIASTOLIC BLOOD PRESSURE: 62 MMHG | SYSTOLIC BLOOD PRESSURE: 99 MMHG

## 2020-08-30 VITALS — DIASTOLIC BLOOD PRESSURE: 68 MMHG | SYSTOLIC BLOOD PRESSURE: 112 MMHG

## 2020-08-30 VITALS — SYSTOLIC BLOOD PRESSURE: 112 MMHG | DIASTOLIC BLOOD PRESSURE: 64 MMHG

## 2020-08-30 VITALS — SYSTOLIC BLOOD PRESSURE: 110 MMHG | DIASTOLIC BLOOD PRESSURE: 64 MMHG

## 2020-08-30 LAB
BASOPHILS # BLD AUTO: 0.01 X10^3/UL (ref 0–0.1)
BASOPHILS NFR BLD AUTO: 0 % (ref 0–1)
EOSINOPHIL # BLD AUTO: 0.05 X10^3/UL (ref 0–0.4)
EOSINOPHIL NFR BLD AUTO: 0 % (ref 1–7)
ERYTHROCYTE [DISTWIDTH] IN BLOOD BY AUTOMATED COUNT: 14.5 % (ref 9.6–15.2)
LYMPHOCYTES # BLD AUTO: 1.54 X10^3/UL (ref 1–3.4)
LYMPHOCYTES NFR BLD AUTO: 10 % (ref 22–44)
MCH RBC QN AUTO: 30.7 PG (ref 27–34.8)
MCHC RBC AUTO-ENTMCNC: 32.6 G/DL (ref 32.4–35.8)
MCV RBC AUTO: 94.1 FL (ref 80–100)
MD: (no result)
MONOCYTES # BLD AUTO: 0.79 X10^3/UL (ref 0.2–0.8)
MONOCYTES NFR BLD AUTO: 5 % (ref 2–9)
NEUTROPHILS # BLD AUTO: 13.32 X10^3/UL (ref 1.8–6.8)
NEUTROPHILS NFR BLD AUTO: 85 % (ref 42–75)
PLATELET # BLD AUTO: 165 X10^3/UL (ref 130–400)
PMV BLD AUTO: 8.6 FL (ref 7.4–10.4)
RBC # BLD AUTO: 3.61 X10^6/UL (ref 3.82–5.3)

## 2020-08-30 RX ADMIN — IBUPROFEN PRN MG: 600 TABLET ORAL at 23:41

## 2020-08-30 RX ADMIN — DOCUSATE SODIUM PRN MG: 100 CAPSULE, LIQUID FILLED ORAL at 08:41

## 2020-08-30 RX ADMIN — SODIUM CHLORIDE, SODIUM LACTATE, POTASSIUM CHLORIDE, AND CALCIUM CHLORIDE SCH MLS/HR: .6; .31; .03; .02 INJECTION, SOLUTION INTRAVENOUS at 01:01

## 2020-08-30 RX ADMIN — Medication SCH MLS/HR: at 16:24

## 2020-08-30 RX ADMIN — Medication SCH MLS/HR: at 06:24

## 2020-08-30 RX ADMIN — PRENATAL VIT W/ FE FUMARATE-FA TAB 27-0.8 MG SCH EACH: 27-0.8 TAB at 08:41

## 2020-08-30 RX ADMIN — Medication SCH MLS/HR: at 01:34

## 2020-08-30 RX ADMIN — SODIUM CHLORIDE, SODIUM LACTATE, POTASSIUM CHLORIDE, AND CALCIUM CHLORIDE SCH MLS/HR: .6; .31; .03; .02 INJECTION, SOLUTION INTRAVENOUS at 17:01

## 2020-08-30 RX ADMIN — SODIUM CHLORIDE, SODIUM LACTATE, POTASSIUM CHLORIDE, AND CALCIUM CHLORIDE SCH MLS/HR: .6; .31; .03; .02 INJECTION, SOLUTION INTRAVENOUS at 09:01

## 2020-08-30 RX ADMIN — IBUPROFEN PRN MG: 600 TABLET ORAL at 04:21

## 2020-08-31 VITALS — SYSTOLIC BLOOD PRESSURE: 108 MMHG | DIASTOLIC BLOOD PRESSURE: 67 MMHG

## 2020-08-31 RX ADMIN — PRENATAL VIT W/ FE FUMARATE-FA TAB 27-0.8 MG SCH EACH: 27-0.8 TAB at 08:26

## 2020-08-31 RX ADMIN — IBUPROFEN PRN MG: 600 TABLET ORAL at 08:27

## 2020-08-31 RX ADMIN — DOCUSATE SODIUM PRN MG: 100 CAPSULE, LIQUID FILLED ORAL at 08:26

## 2022-03-01 ENCOUNTER — APPOINTMENT (RX ONLY)
Dept: URBAN - METROPOLITAN AREA CLINIC 22 | Facility: CLINIC | Age: 32
Setting detail: DERMATOLOGY
End: 2022-03-01

## 2022-03-01 DIAGNOSIS — L20.89 OTHER ATOPIC DERMATITIS: ICD-10-CM

## 2022-03-01 PROBLEM — L20.84 INTRINSIC (ALLERGIC) ECZEMA: Status: ACTIVE | Noted: 2022-03-01

## 2022-03-01 PROCEDURE — ? COUNSELING

## 2022-03-01 PROCEDURE — 99203 OFFICE O/P NEW LOW 30 MIN: CPT

## 2022-03-01 PROCEDURE — ? TREATMENT REGIMEN

## 2022-03-01 PROCEDURE — ? PRESCRIPTION

## 2022-03-01 RX ORDER — TRIAMCINOLONE ACETONIDE 1 MG/G
1 OINTMENT TOPICAL BID
Qty: 30 | Refills: 3 | Status: ERX | COMMUNITY
Start: 2022-03-01

## 2022-03-01 RX ADMIN — TRIAMCINOLONE ACETONIDE 1: 1 OINTMENT TOPICAL at 00:00

## 2022-03-01 ASSESSMENT — SEVERITY ASSESSMENT 2020: SEVERITY 2020: MODERATE

## 2022-03-01 ASSESSMENT — LOCATION ZONE DERM: LOCATION ZONE: HAND

## 2022-03-01 ASSESSMENT — LOCATION DETAILED DESCRIPTION DERM
LOCATION DETAILED: RIGHT RADIAL DORSAL HAND
LOCATION DETAILED: LEFT RADIAL DORSAL HAND

## 2022-03-01 ASSESSMENT — LOCATION SIMPLE DESCRIPTION DERM
LOCATION SIMPLE: LEFT HAND
LOCATION SIMPLE: RIGHT HAND

## 2022-03-01 NOTE — HPI: RASH
How Severe Is Your Rash?: moderate
Is This A New Presentation, Or A Follow-Up?: Rash
Additional History: Patient used hydrocortisone 2.5% with minimal relief. She is currently using Aquaphor.

## 2022-03-01 NOTE — PROCEDURE: TREATMENT REGIMEN
Continue Regimen: Aquaphor twice daily to moisturize
Initiate Treatment: Triamcinolone 0.1% ointment twice a day for 2 weeks, may use Eucrisa once daily after 2 weeks of Triamcinolone
Detail Level: Simple

## 2023-07-26 ENCOUNTER — APPOINTMENT (RX ONLY)
Dept: URBAN - METROPOLITAN AREA CLINIC 22 | Facility: CLINIC | Age: 33
Setting detail: DERMATOLOGY
End: 2023-07-26

## 2023-07-26 DIAGNOSIS — L91.8 OTHER HYPERTROPHIC DISORDERS OF THE SKIN: ICD-10-CM

## 2023-07-26 DIAGNOSIS — L30.1 DYSHIDROSIS [POMPHOLYX]: ICD-10-CM

## 2023-07-26 DIAGNOSIS — D22 MELANOCYTIC NEVI: ICD-10-CM

## 2023-07-26 DIAGNOSIS — L82.1 OTHER SEBORRHEIC KERATOSIS: ICD-10-CM

## 2023-07-26 DIAGNOSIS — B36.0 PITYRIASIS VERSICOLOR: ICD-10-CM

## 2023-07-26 DIAGNOSIS — L81.4 OTHER MELANIN HYPERPIGMENTATION: ICD-10-CM

## 2023-07-26 DIAGNOSIS — Z71.89 OTHER SPECIFIED COUNSELING: ICD-10-CM

## 2023-07-26 PROBLEM — D22.5 MELANOCYTIC NEVI OF TRUNK: Status: ACTIVE | Noted: 2023-07-26

## 2023-07-26 PROCEDURE — ? KOH PREP

## 2023-07-26 PROCEDURE — ? PRESCRIPTION

## 2023-07-26 PROCEDURE — ? PHOTO-DOCUMENTATION

## 2023-07-26 PROCEDURE — ? COUNSELING

## 2023-07-26 PROCEDURE — ? SUNSCREEN TREATMENT REGIMEN

## 2023-07-26 PROCEDURE — 99213 OFFICE O/P EST LOW 20 MIN: CPT

## 2023-07-26 RX ORDER — KETOCONAZOLE 20 MG/G
CREAM TOPICAL BID
Qty: 60 | Refills: 1 | Status: ERX | COMMUNITY
Start: 2023-07-26

## 2023-07-26 RX ORDER — KETOCONAZOLE 20 MG/ML
SHAMPOO, SUSPENSION TOPICAL QD
Qty: 360 | Refills: 1 | Status: ERX | COMMUNITY
Start: 2023-07-26

## 2023-07-26 RX ADMIN — KETOCONAZOLE: 20 CREAM TOPICAL at 00:00

## 2023-07-26 RX ADMIN — KETOCONAZOLE: 20 SHAMPOO, SUSPENSION TOPICAL at 00:00

## 2023-07-26 ASSESSMENT — LOCATION DETAILED DESCRIPTION DERM
LOCATION DETAILED: RIGHT INFERIOR MEDIAL MIDBACK
LOCATION DETAILED: LEFT VENTRAL PROXIMAL FOREARM
LOCATION DETAILED: RIGHT INFERIOR CENTRAL MALAR CHEEK
LOCATION DETAILED: RIGHT RIB CAGE
LOCATION DETAILED: LEFT LATERAL INFERIOR EYELID
LOCATION DETAILED: LEFT LATERAL ABDOMEN
LOCATION DETAILED: SUPERIOR THORACIC SPINE
LOCATION DETAILED: LEFT INFERIOR CENTRAL MALAR CHEEK
LOCATION DETAILED: LEFT INFERIOR UPPER BACK
LOCATION DETAILED: RIGHT VENTRAL PROXIMAL FOREARM
LOCATION DETAILED: INFERIOR MID FOREHEAD

## 2023-07-26 ASSESSMENT — LOCATION ZONE DERM
LOCATION ZONE: EYELID
LOCATION ZONE: FACE
LOCATION ZONE: TRUNK
LOCATION ZONE: ARM

## 2023-07-26 ASSESSMENT — LOCATION SIMPLE DESCRIPTION DERM
LOCATION SIMPLE: RIGHT CHEEK
LOCATION SIMPLE: RIGHT LOWER BACK
LOCATION SIMPLE: LEFT UPPER BACK
LOCATION SIMPLE: LEFT FOREARM
LOCATION SIMPLE: UPPER BACK
LOCATION SIMPLE: LEFT INFERIOR EYELID
LOCATION SIMPLE: RIGHT FOREARM
LOCATION SIMPLE: INFERIOR FOREHEAD
LOCATION SIMPLE: ABDOMEN
LOCATION SIMPLE: LEFT CHEEK

## 2024-07-29 ENCOUNTER — APPOINTMENT (RX ONLY)
Dept: URBAN - METROPOLITAN AREA CLINIC 22 | Facility: CLINIC | Age: 34
Setting detail: DERMATOLOGY
End: 2024-07-29

## 2024-07-29 DIAGNOSIS — Z71.89 OTHER SPECIFIED COUNSELING: ICD-10-CM

## 2024-07-29 DIAGNOSIS — L82.1 OTHER SEBORRHEIC KERATOSIS: ICD-10-CM

## 2024-07-29 DIAGNOSIS — D22 MELANOCYTIC NEVI: ICD-10-CM

## 2024-07-29 DIAGNOSIS — L91.8 OTHER HYPERTROPHIC DISORDERS OF THE SKIN: ICD-10-CM

## 2024-07-29 DIAGNOSIS — L90.5 SCAR CONDITIONS AND FIBROSIS OF SKIN: ICD-10-CM

## 2024-07-29 PROBLEM — D22.5 MELANOCYTIC NEVI OF TRUNK: Status: ACTIVE | Noted: 2024-07-29

## 2024-07-29 PROCEDURE — ? SUNSCREEN TREATMENT REGIMEN

## 2024-07-29 PROCEDURE — 17110 DESTRUCTION B9 LES UP TO 14: CPT

## 2024-07-29 PROCEDURE — 99213 OFFICE O/P EST LOW 20 MIN: CPT | Mod: 25

## 2024-07-29 PROCEDURE — ? BENIGN DESTRUCTION

## 2024-07-29 PROCEDURE — ? COUNSELING

## 2024-07-29 ASSESSMENT — LOCATION DETAILED DESCRIPTION DERM
LOCATION DETAILED: LEFT SUPRAPUBIC SKIN
LOCATION DETAILED: SUPERIOR THORACIC SPINE
LOCATION DETAILED: LEFT INFERIOR CENTRAL MALAR CHEEK
LOCATION DETAILED: RIGHT INFERIOR CENTRAL MALAR CHEEK
LOCATION DETAILED: LEFT MEDIAL INFERIOR EYELID

## 2024-07-29 ASSESSMENT — LOCATION ZONE DERM
LOCATION ZONE: EYELID
LOCATION ZONE: TRUNK
LOCATION ZONE: FACE

## 2024-07-29 ASSESSMENT — LOCATION SIMPLE DESCRIPTION DERM
LOCATION SIMPLE: LEFT INFERIOR EYELID
LOCATION SIMPLE: GROIN
LOCATION SIMPLE: UPPER BACK
LOCATION SIMPLE: RIGHT CHEEK
LOCATION SIMPLE: LEFT CHEEK

## 2024-07-29 NOTE — HPI: FULL BODY SKIN EXAMINATION
How Severe Are Your Spot(S)?: moderate
What Type Of Note Output Would You Prefer (Optional)?: Standard Output
What Is The Reason For Today's Visit?: Full Body Skin Examination
What Is The Reason For Today's Visit? (Being Monitored For X): concerning skin lesions on a periodic basis
Skin Substitute Text: The defect edges were debeveled with a #15 scalpel blade.  Given the location of the defect, shape of the defect and the proximity to free margins a skin substitute graft was deemed most appropriate.  The graft material was trimmed to fit the size of the defect. The graft was then placed in the primary defect and oriented appropriately.

## 2024-07-29 NOTE — PROCEDURE: BENIGN DESTRUCTION
Include Z78.9 (Other Specified Conditions Influencing Health Status) As An Associated Diagnosis?: No
Bill Insurance (You Assume Risk Of Denial Or Audit By Selecting Yes): Yes
Anesthesia Volume In Cc: 1
Medical Necessity Information: It is in your best interest to select a reason for this procedure from the list below. All of these items fulfill various CMS LCD requirements except the new and changing color options.
Consent: The patient's consent was obtained including but not limited to risks of crusting, scabbing, blistering, scarring, darker or lighter pigmentary change, recurrence, incomplete removal and infection.
Medical Necessity Clause: This procedure was medically necessary because the lesions that were treated were:
Post-Care Instructions: I reviewed with the patient in detail post-care instructions. Patient is to wear sunprotection, and avoid picking at any of the treated lesions. Pt may apply Vaseline to crusted or scabbing areas.
Detail Level: Detailed

## 2025-02-01 ENCOUNTER — APPOINTMENT (OUTPATIENT)
Dept: LAB | Facility: MEDICAL CENTER | Age: 35
End: 2025-02-01

## 2025-02-15 ENCOUNTER — HOSPITAL ENCOUNTER (OUTPATIENT)
Dept: LAB | Facility: MEDICAL CENTER | Age: 35
End: 2025-02-15
Attending: OBSTETRICS & GYNECOLOGY
Payer: COMMERCIAL

## 2025-02-15 LAB — PROLACTIN SERPL-MCNC: 9.45 NG/ML (ref 2.8–26)

## 2025-02-15 PROCEDURE — 36415 COLL VENOUS BLD VENIPUNCTURE: CPT

## 2025-02-15 PROCEDURE — 84146 ASSAY OF PROLACTIN: CPT

## 2025-07-29 ENCOUNTER — APPOINTMENT (OUTPATIENT)
Dept: URBAN - METROPOLITAN AREA CLINIC 22 | Facility: CLINIC | Age: 35
Setting detail: DERMATOLOGY
End: 2025-07-29

## 2025-07-29 DIAGNOSIS — L81.4 OTHER MELANIN HYPERPIGMENTATION: ICD-10-CM

## 2025-07-29 DIAGNOSIS — D22 MELANOCYTIC NEVI: ICD-10-CM

## 2025-07-29 DIAGNOSIS — L81.6 OTHER DISORDERS OF DIMINISHED MELANIN FORMATION: ICD-10-CM

## 2025-07-29 DIAGNOSIS — Z71.89 OTHER SPECIFIED COUNSELING: ICD-10-CM

## 2025-07-29 DIAGNOSIS — L82.1 OTHER SEBORRHEIC KERATOSIS: ICD-10-CM

## 2025-07-29 PROBLEM — D22.61 MELANOCYTIC NEVI OF RIGHT UPPER LIMB, INCLUDING SHOULDER: Status: ACTIVE | Noted: 2025-07-29

## 2025-07-29 PROBLEM — D22.5 MELANOCYTIC NEVI OF TRUNK: Status: ACTIVE | Noted: 2025-07-29

## 2025-07-29 PROCEDURE — ? COUNSELING

## 2025-07-29 PROCEDURE — ? SUNSCREEN RECOMMENDATIONS

## 2025-07-29 ASSESSMENT — LOCATION DETAILED DESCRIPTION DERM
LOCATION DETAILED: SUPERIOR THORACIC SPINE
LOCATION DETAILED: RIGHT RADIAL DORSAL HAND
LOCATION DETAILED: LEFT INFERIOR CENTRAL MALAR CHEEK
LOCATION DETAILED: RIGHT ANTERIOR SHOULDER
LOCATION DETAILED: INFERIOR MID FOREHEAD
LOCATION DETAILED: INFERIOR THORACIC SPINE
LOCATION DETAILED: RIGHT DISTAL DORSAL FOREARM
LOCATION DETAILED: RIGHT PROXIMAL DORSAL FOREARM
LOCATION DETAILED: EPIGASTRIC SKIN
LOCATION DETAILED: RIGHT INFERIOR CENTRAL MALAR CHEEK
LOCATION DETAILED: RIGHT VENTRAL PROXIMAL FOREARM
LOCATION DETAILED: LEFT VENTRAL DISTAL FOREARM
LOCATION DETAILED: RIGHT ANTERIOR DISTAL UPPER ARM

## 2025-07-29 ASSESSMENT — LOCATION SIMPLE DESCRIPTION DERM
LOCATION SIMPLE: LEFT FOREARM
LOCATION SIMPLE: RIGHT FOREARM
LOCATION SIMPLE: INFERIOR FOREHEAD
LOCATION SIMPLE: RIGHT UPPER ARM
LOCATION SIMPLE: UPPER BACK
LOCATION SIMPLE: ABDOMEN
LOCATION SIMPLE: RIGHT HAND
LOCATION SIMPLE: RIGHT SHOULDER
LOCATION SIMPLE: LEFT CHEEK
LOCATION SIMPLE: RIGHT CHEEK

## 2025-07-29 ASSESSMENT — LOCATION ZONE DERM
LOCATION ZONE: HAND
LOCATION ZONE: TRUNK
LOCATION ZONE: ARM
LOCATION ZONE: FACE